# Patient Record
Sex: FEMALE | Race: WHITE | NOT HISPANIC OR LATINO | URBAN - METROPOLITAN AREA
[De-identification: names, ages, dates, MRNs, and addresses within clinical notes are randomized per-mention and may not be internally consistent; named-entity substitution may affect disease eponyms.]

---

## 2017-05-13 ENCOUNTER — EMERGENCY (EMERGENCY)
Facility: HOSPITAL | Age: 82
LOS: 0 days | Discharge: HOME | End: 2017-05-13
Admitting: FAMILY MEDICINE

## 2017-05-27 ENCOUNTER — OUTPATIENT (OUTPATIENT)
Dept: OUTPATIENT SERVICES | Facility: HOSPITAL | Age: 82
LOS: 1 days | Discharge: HOME | End: 2017-05-27

## 2017-06-28 DIAGNOSIS — R91.8 OTHER NONSPECIFIC ABNORMAL FINDING OF LUNG FIELD: ICD-10-CM

## 2017-07-11 DIAGNOSIS — I10 ESSENTIAL (PRIMARY) HYPERTENSION: ICD-10-CM

## 2017-07-11 DIAGNOSIS — Z88.8 ALLERGY STATUS TO OTHER DRUGS, MEDICAMENTS AND BIOLOGICAL SUBSTANCES STATUS: ICD-10-CM

## 2017-07-11 DIAGNOSIS — R55 SYNCOPE AND COLLAPSE: ICD-10-CM

## 2017-07-11 DIAGNOSIS — K21.9 GASTRO-ESOPHAGEAL REFLUX DISEASE WITHOUT ESOPHAGITIS: ICD-10-CM

## 2017-07-11 DIAGNOSIS — R19.7 DIARRHEA, UNSPECIFIED: ICD-10-CM

## 2017-07-11 DIAGNOSIS — Z79.899 OTHER LONG TERM (CURRENT) DRUG THERAPY: ICD-10-CM

## 2017-07-11 DIAGNOSIS — Z88.0 ALLERGY STATUS TO PENICILLIN: ICD-10-CM

## 2017-07-11 DIAGNOSIS — Z79.82 LONG TERM (CURRENT) USE OF ASPIRIN: ICD-10-CM

## 2017-07-11 DIAGNOSIS — Z88.1 ALLERGY STATUS TO OTHER ANTIBIOTIC AGENTS STATUS: ICD-10-CM

## 2017-10-11 ENCOUNTER — OUTPATIENT (OUTPATIENT)
Dept: OUTPATIENT SERVICES | Facility: HOSPITAL | Age: 82
LOS: 1 days | Discharge: HOME | End: 2017-10-11

## 2017-10-11 DIAGNOSIS — E09.65 DRUG OR CHEMICAL INDUCED DIABETES MELLITUS WITH HYPERGLYCEMIA: ICD-10-CM

## 2017-10-11 DIAGNOSIS — E06.3 AUTOIMMUNE THYROIDITIS: ICD-10-CM

## 2017-10-11 DIAGNOSIS — E78.2 MIXED HYPERLIPIDEMIA: ICD-10-CM

## 2017-10-11 DIAGNOSIS — D53.9 NUTRITIONAL ANEMIA, UNSPECIFIED: ICD-10-CM

## 2017-10-11 DIAGNOSIS — I10 ESSENTIAL (PRIMARY) HYPERTENSION: ICD-10-CM

## 2018-05-08 ENCOUNTER — OUTPATIENT (OUTPATIENT)
Dept: OUTPATIENT SERVICES | Facility: HOSPITAL | Age: 83
LOS: 1 days | Discharge: HOME | End: 2018-05-08

## 2018-05-08 DIAGNOSIS — D53.9 NUTRITIONAL ANEMIA, UNSPECIFIED: ICD-10-CM

## 2018-05-08 DIAGNOSIS — R53.83 OTHER FATIGUE: ICD-10-CM

## 2018-05-08 DIAGNOSIS — E06.3 AUTOIMMUNE THYROIDITIS: ICD-10-CM

## 2018-05-08 DIAGNOSIS — E78.2 MIXED HYPERLIPIDEMIA: ICD-10-CM

## 2018-10-01 ENCOUNTER — OUTPATIENT (OUTPATIENT)
Dept: OUTPATIENT SERVICES | Facility: HOSPITAL | Age: 83
LOS: 1 days | Discharge: HOME | End: 2018-10-01

## 2018-10-01 DIAGNOSIS — E53.9 VITAMIN B DEFICIENCY, UNSPECIFIED: ICD-10-CM

## 2018-10-01 DIAGNOSIS — R53.83 OTHER FATIGUE: ICD-10-CM

## 2018-10-01 DIAGNOSIS — I10 ESSENTIAL (PRIMARY) HYPERTENSION: ICD-10-CM

## 2018-10-01 DIAGNOSIS — E06.3 AUTOIMMUNE THYROIDITIS: ICD-10-CM

## 2018-10-01 DIAGNOSIS — E78.2 MIXED HYPERLIPIDEMIA: ICD-10-CM

## 2018-10-01 DIAGNOSIS — D53.9 NUTRITIONAL ANEMIA, UNSPECIFIED: ICD-10-CM

## 2018-10-01 DIAGNOSIS — E55.9 VITAMIN D DEFICIENCY, UNSPECIFIED: ICD-10-CM

## 2019-03-13 ENCOUNTER — OUTPATIENT (OUTPATIENT)
Dept: OUTPATIENT SERVICES | Facility: HOSPITAL | Age: 84
LOS: 1 days | Discharge: HOME | End: 2019-03-13

## 2019-03-13 DIAGNOSIS — D53.9 NUTRITIONAL ANEMIA, UNSPECIFIED: ICD-10-CM

## 2019-03-13 DIAGNOSIS — E53.9 VITAMIN B DEFICIENCY, UNSPECIFIED: ICD-10-CM

## 2019-03-13 DIAGNOSIS — E09.65 DRUG OR CHEMICAL INDUCED DIABETES MELLITUS WITH HYPERGLYCEMIA: ICD-10-CM

## 2019-03-13 DIAGNOSIS — Z00.00 ENCOUNTER FOR GENERAL ADULT MEDICAL EXAMINATION WITHOUT ABNORMAL FINDINGS: ICD-10-CM

## 2019-03-13 DIAGNOSIS — E78.2 MIXED HYPERLIPIDEMIA: ICD-10-CM

## 2019-03-13 DIAGNOSIS — I10 ESSENTIAL (PRIMARY) HYPERTENSION: ICD-10-CM

## 2019-03-13 DIAGNOSIS — E11.8 TYPE 2 DIABETES MELLITUS WITH UNSPECIFIED COMPLICATIONS: ICD-10-CM

## 2019-03-13 DIAGNOSIS — E06.3 AUTOIMMUNE THYROIDITIS: ICD-10-CM

## 2019-03-13 DIAGNOSIS — E55.9 VITAMIN D DEFICIENCY, UNSPECIFIED: ICD-10-CM

## 2019-09-10 ENCOUNTER — OUTPATIENT (OUTPATIENT)
Dept: OUTPATIENT SERVICES | Facility: HOSPITAL | Age: 84
LOS: 1 days | Discharge: HOME | End: 2019-09-10

## 2019-09-10 DIAGNOSIS — E06.3 AUTOIMMUNE THYROIDITIS: ICD-10-CM

## 2019-09-10 DIAGNOSIS — D53.9 NUTRITIONAL ANEMIA, UNSPECIFIED: ICD-10-CM

## 2019-09-10 DIAGNOSIS — I10 ESSENTIAL (PRIMARY) HYPERTENSION: ICD-10-CM

## 2019-09-10 DIAGNOSIS — E78.2 MIXED HYPERLIPIDEMIA: ICD-10-CM

## 2019-09-10 DIAGNOSIS — E55.9 VITAMIN D DEFICIENCY, UNSPECIFIED: ICD-10-CM

## 2019-09-10 DIAGNOSIS — E11.8 TYPE 2 DIABETES MELLITUS WITH UNSPECIFIED COMPLICATIONS: ICD-10-CM

## 2019-09-10 DIAGNOSIS — E53.9 VITAMIN B DEFICIENCY, UNSPECIFIED: ICD-10-CM

## 2019-09-10 DIAGNOSIS — Z00.00 ENCOUNTER FOR GENERAL ADULT MEDICAL EXAMINATION WITHOUT ABNORMAL FINDINGS: ICD-10-CM

## 2021-08-07 ENCOUNTER — INPATIENT (INPATIENT)
Facility: HOSPITAL | Age: 86
LOS: 1 days | Discharge: HOME HEALTH | End: 2021-08-09
Attending: STUDENT IN AN ORGANIZED HEALTH CARE EDUCATION/TRAINING PROGRAM | Admitting: STUDENT IN AN ORGANIZED HEALTH CARE EDUCATION/TRAINING PROGRAM
Payer: MEDICARE

## 2021-08-07 VITALS
HEIGHT: 63 IN | SYSTOLIC BLOOD PRESSURE: 166 MMHG | WEIGHT: 149.91 LBS | OXYGEN SATURATION: 96 % | HEART RATE: 83 BPM | TEMPERATURE: 98 F | DIASTOLIC BLOOD PRESSURE: 71 MMHG | RESPIRATION RATE: 16 BRPM

## 2021-08-07 LAB
ALBUMIN SERPL ELPH-MCNC: 4.5 G/DL — SIGNIFICANT CHANGE UP (ref 3.5–5.2)
ALP SERPL-CCNC: 104 U/L — SIGNIFICANT CHANGE UP (ref 30–115)
ALT FLD-CCNC: 11 U/L — SIGNIFICANT CHANGE UP (ref 0–41)
ANION GAP SERPL CALC-SCNC: 13 MMOL/L — SIGNIFICANT CHANGE UP (ref 7–14)
APPEARANCE UR: CLEAR — SIGNIFICANT CHANGE UP
AST SERPL-CCNC: 17 U/L — SIGNIFICANT CHANGE UP (ref 0–41)
BASOPHILS # BLD AUTO: 0.02 K/UL — SIGNIFICANT CHANGE UP (ref 0–0.2)
BASOPHILS NFR BLD AUTO: 0.2 % — SIGNIFICANT CHANGE UP (ref 0–1)
BILIRUB SERPL-MCNC: 0.6 MG/DL — SIGNIFICANT CHANGE UP (ref 0.2–1.2)
BILIRUB UR-MCNC: NEGATIVE — SIGNIFICANT CHANGE UP
BUN SERPL-MCNC: 14 MG/DL — SIGNIFICANT CHANGE UP (ref 10–20)
CALCIUM SERPL-MCNC: 9.2 MG/DL — SIGNIFICANT CHANGE UP (ref 8.5–10.1)
CHLORIDE SERPL-SCNC: 105 MMOL/L — SIGNIFICANT CHANGE UP (ref 98–110)
CO2 SERPL-SCNC: 23 MMOL/L — SIGNIFICANT CHANGE UP (ref 17–32)
COLOR SPEC: YELLOW — SIGNIFICANT CHANGE UP
CREAT SERPL-MCNC: 0.8 MG/DL — SIGNIFICANT CHANGE UP (ref 0.7–1.5)
DIFF PNL FLD: NEGATIVE — SIGNIFICANT CHANGE UP
EOSINOPHIL # BLD AUTO: 0 K/UL — SIGNIFICANT CHANGE UP (ref 0–0.7)
EOSINOPHIL NFR BLD AUTO: 0 % — SIGNIFICANT CHANGE UP (ref 0–8)
GLUCOSE SERPL-MCNC: 125 MG/DL — HIGH (ref 70–99)
GLUCOSE UR QL: NEGATIVE — SIGNIFICANT CHANGE UP
HCT VFR BLD CALC: 39.3 % — SIGNIFICANT CHANGE UP (ref 37–47)
HGB BLD-MCNC: 12.8 G/DL — SIGNIFICANT CHANGE UP (ref 12–16)
IMM GRANULOCYTES NFR BLD AUTO: 0.4 % — HIGH (ref 0.1–0.3)
KETONES UR-MCNC: NEGATIVE — SIGNIFICANT CHANGE UP
LEUKOCYTE ESTERASE UR-ACNC: NEGATIVE — SIGNIFICANT CHANGE UP
LYMPHOCYTES # BLD AUTO: 0.66 K/UL — LOW (ref 1.2–3.4)
LYMPHOCYTES # BLD AUTO: 8 % — LOW (ref 20.5–51.1)
MAGNESIUM SERPL-MCNC: 1.7 MG/DL — LOW (ref 1.8–2.4)
MCHC RBC-ENTMCNC: 28.1 PG — SIGNIFICANT CHANGE UP (ref 27–31)
MCHC RBC-ENTMCNC: 32.6 G/DL — SIGNIFICANT CHANGE UP (ref 32–37)
MCV RBC AUTO: 86.2 FL — SIGNIFICANT CHANGE UP (ref 81–99)
MONOCYTES # BLD AUTO: 0.38 K/UL — SIGNIFICANT CHANGE UP (ref 0.1–0.6)
MONOCYTES NFR BLD AUTO: 4.6 % — SIGNIFICANT CHANGE UP (ref 1.7–9.3)
NEUTROPHILS # BLD AUTO: 7.12 K/UL — HIGH (ref 1.4–6.5)
NEUTROPHILS NFR BLD AUTO: 86.8 % — HIGH (ref 42.2–75.2)
NITRITE UR-MCNC: NEGATIVE — SIGNIFICANT CHANGE UP
NRBC # BLD: 0 /100 WBCS — SIGNIFICANT CHANGE UP (ref 0–0)
PH UR: 6.5 — SIGNIFICANT CHANGE UP (ref 5–8)
PLATELET # BLD AUTO: 294 K/UL — SIGNIFICANT CHANGE UP (ref 130–400)
POTASSIUM SERPL-MCNC: 3.8 MMOL/L — SIGNIFICANT CHANGE UP (ref 3.5–5)
POTASSIUM SERPL-SCNC: 3.8 MMOL/L — SIGNIFICANT CHANGE UP (ref 3.5–5)
PROT SERPL-MCNC: 6.6 G/DL — SIGNIFICANT CHANGE UP (ref 6–8)
PROT UR-MCNC: NEGATIVE — SIGNIFICANT CHANGE UP
RBC # BLD: 4.56 M/UL — SIGNIFICANT CHANGE UP (ref 4.2–5.4)
RBC # FLD: 14.4 % — SIGNIFICANT CHANGE UP (ref 11.5–14.5)
SODIUM SERPL-SCNC: 141 MMOL/L — SIGNIFICANT CHANGE UP (ref 135–146)
SP GR SPEC: 1.01 — SIGNIFICANT CHANGE UP (ref 1.01–1.03)
TROPONIN T SERPL-MCNC: <0.01 NG/ML — SIGNIFICANT CHANGE UP
UROBILINOGEN FLD QL: SIGNIFICANT CHANGE UP
WBC # BLD: 8.21 K/UL — SIGNIFICANT CHANGE UP (ref 4.8–10.8)
WBC # FLD AUTO: 8.21 K/UL — SIGNIFICANT CHANGE UP (ref 4.8–10.8)

## 2021-08-07 PROCEDURE — 70498 CT ANGIOGRAPHY NECK: CPT | Mod: 26,MA

## 2021-08-07 PROCEDURE — 99285 EMERGENCY DEPT VISIT HI MDM: CPT

## 2021-08-07 PROCEDURE — 70450 CT HEAD/BRAIN W/O DYE: CPT | Mod: 26,59,MA

## 2021-08-07 PROCEDURE — 93010 ELECTROCARDIOGRAM REPORT: CPT | Mod: 76

## 2021-08-07 PROCEDURE — 70496 CT ANGIOGRAPHY HEAD: CPT | Mod: 26,MA

## 2021-08-07 RX ORDER — ACETAMINOPHEN 500 MG
650 TABLET ORAL ONCE
Refills: 0 | Status: COMPLETED | OUTPATIENT
Start: 2021-08-07 | End: 2021-08-07

## 2021-08-07 RX ORDER — MAGNESIUM OXIDE 400 MG ORAL TABLET 241.3 MG
400 TABLET ORAL ONCE
Refills: 0 | Status: COMPLETED | OUTPATIENT
Start: 2021-08-07 | End: 2021-08-07

## 2021-08-07 RX ORDER — METOCLOPRAMIDE HCL 10 MG
10 TABLET ORAL ONCE
Refills: 0 | Status: COMPLETED | OUTPATIENT
Start: 2021-08-07 | End: 2021-08-07

## 2021-08-07 RX ORDER — MECLIZINE HCL 12.5 MG
50 TABLET ORAL ONCE
Refills: 0 | Status: COMPLETED | OUTPATIENT
Start: 2021-08-07 | End: 2021-08-07

## 2021-08-07 RX ORDER — SODIUM CHLORIDE 9 MG/ML
250 INJECTION INTRAMUSCULAR; INTRAVENOUS; SUBCUTANEOUS ONCE
Refills: 0 | Status: COMPLETED | OUTPATIENT
Start: 2021-08-07 | End: 2021-08-07

## 2021-08-07 RX ADMIN — Medication 50 MILLIGRAM(S): at 16:27

## 2021-08-07 RX ADMIN — SODIUM CHLORIDE 250 MILLILITER(S): 9 INJECTION INTRAMUSCULAR; INTRAVENOUS; SUBCUTANEOUS at 16:28

## 2021-08-07 RX ADMIN — Medication 104 MILLIGRAM(S): at 16:27

## 2021-08-07 RX ADMIN — Medication 650 MILLIGRAM(S): at 20:27

## 2021-08-07 RX ADMIN — MAGNESIUM OXIDE 400 MG ORAL TABLET 400 MILLIGRAM(S): 241.3 TABLET ORAL at 18:40

## 2021-08-07 NOTE — ED PROVIDER NOTE - PHYSICAL EXAMINATION
CONST: Well appearing in NAD  EYES: PERRL, EOMI, Sclera and conjunctiva clear.  NECK: Non-tender, no meningeal signs, supple,  CARD: Normal S1 S2; Normal rate and rhythm  RESP: Equal BS B/L, No wheezes, rhonchi or rales. No distress  GI: Soft, non-tender, non-distended.  MS: Normal ROM in all extremities. No edema of lower extremities, no calf pain, radial pulses 2+ bilaterally  SKIN: chronic lesions of forehead.   NEURO: A&Ox3, CN II-XII intact, no focal deficits, no facial asymmetry, no pronator drift, normal finger to nose, no nystagmus, gross sensation intact, UE/LE strength 5/5

## 2021-08-07 NOTE — ED ADULT NURSE NOTE - OBJECTIVE STATEMENT
87 year old female complaining of dizziness since last night. Pt states she has been "spitting up" but denies fever, nausea, diarrhea.

## 2021-08-07 NOTE — ED PROVIDER NOTE - CARE PLAN
Principal Discharge DX:	Vertigo  Goal:	intractable  Secondary Diagnosis:	Ambulatory dysfunction  Secondary Diagnosis:	Inadequate social support

## 2021-08-07 NOTE — ED PROVIDER NOTE - ATTENDING CONTRIBUTION TO CARE
86 yo f with pmh of gerd, bells palsy, htn, hld, presents with c/o dizziness.  pt says started last night but worse today.  exacerbated by head movement, bending down or turning her head too fast.  pt says feels worse than her usual vertigo.  denies headache, neck pain, cp, sob, abd pain, leg swelling.  denies facial droop, slurred speech, vision changes, extremity weakness.  pt says she had a hard time walking due to the dizziness.  exam: nad, ncat, perrl, eomi, mmm, rrr, ctab, abd soft, nt,nd, aox3, cn2-12 normal, 5/5 strength all ext, sensation intact, finger to nose normal, romberg neg, prontator drift neg, gait normal imp: pt with dizziness, will check labs, ekg, cxr, ct, cta h/n, meclizine, reassess

## 2021-08-07 NOTE — ED PROVIDER NOTE - OBJECTIVE STATEMENT
87 y.o female w/ hx of HTN, HLD, GERD, Bell's Palsy (L side), vertigo presents to the ED for evaluation of dizziness. last night developed room spinning sensation, worse w/ moving head, mild severity, no associated pain. States since onset sxs persistent prompting visit to the ED. States dizziness worse than typical vertigo.  Denies recent fall/injury, headache, changes in vision, fever, chills, chest pain, dyspnea, abd pain, nausea, vomiting. Also admits to dry cough since yesterday.

## 2021-08-07 NOTE — ED PROVIDER NOTE - CLINICAL SUMMARY MEDICAL DECISION MAKING FREE TEXT BOX
Patient was signed out to me by Dr. Joseph pending CTA results. Patient remained hemodynamically stable, results of the labs, imaging findings reviewed and discussed with patient, discussed with admitting physician and MAR, patient is admitted to Medicine for further evaluation and care.

## 2021-08-07 NOTE — ED PROVIDER NOTE - NS ED ROS FT
Constitutional: See HPI.  Eyes: No visual changes, eye pain or discharge.   ENMT: No hearing changes, pain, discharge or infections. No neck pain or stiffness. No limited ROM  Cardiac: No SOB or edema. No chest pain with exertion.  Respiratory: No cough or respiratory distress.   GI: No nausea, vomiting, diarrhea or abdominal pain.  : No dysuria, frequency or burning. No Discharge  MS: No myalgia, muscle weakness, joint pain or back pain.  Neuro: + dizziness. No headache or weakness. No LOC.  Skin: No skin rash.  Except as documented in the HPI, all other systems are negative.

## 2021-08-07 NOTE — ED ADULT NURSE NOTE - NSIMPLEMENTINTERV_GEN_ALL_ED
Implemented All Fall with Harm Risk Interventions:  [place] to call system. Call bell, personal items and telephone within reach. Instruct patient to call for assistance. Room bathroom lighting operational. Non-slip footwear when patient is off stretcher. Physically safe environment: no spills, clutter or unnecessary equipment. Stretcher in lowest position, wheels locked, appropriate side rails in place. Provide visual cue, wrist band, yellow gown, etc. Monitor gait and stability. Monitor for mental status changes and reorient to person, place, and time. Review medications for side effects contributing to fall risk. Reinforce activity limits and safety measures with patient and family. Provide visual clues: red socks.

## 2021-08-08 LAB — SARS-COV-2 RNA SPEC QL NAA+PROBE: SIGNIFICANT CHANGE UP

## 2021-08-08 PROCEDURE — 99223 1ST HOSP IP/OBS HIGH 75: CPT

## 2021-08-08 RX ORDER — CITALOPRAM 10 MG/1
1 TABLET, FILM COATED ORAL
Qty: 0 | Refills: 0 | DISCHARGE

## 2021-08-08 RX ORDER — LOSARTAN POTASSIUM 100 MG/1
100 TABLET, FILM COATED ORAL DAILY
Refills: 0 | Status: DISCONTINUED | OUTPATIENT
Start: 2021-08-08 | End: 2021-08-09

## 2021-08-08 RX ORDER — PREGABALIN 225 MG/1
1000 CAPSULE ORAL DAILY
Refills: 0 | Status: DISCONTINUED | OUTPATIENT
Start: 2021-08-08 | End: 2021-08-09

## 2021-08-08 RX ORDER — PANTOPRAZOLE SODIUM 20 MG/1
40 TABLET, DELAYED RELEASE ORAL
Refills: 0 | Status: DISCONTINUED | OUTPATIENT
Start: 2021-08-08 | End: 2021-08-09

## 2021-08-08 RX ORDER — IRBESARTAN 75 MG/1
1 TABLET ORAL
Qty: 0 | Refills: 0 | DISCHARGE

## 2021-08-08 RX ORDER — ALPRAZOLAM 0.25 MG
0 TABLET ORAL
Qty: 0 | Refills: 0 | DISCHARGE

## 2021-08-08 RX ORDER — HEPARIN SODIUM 5000 [USP'U]/ML
5000 INJECTION INTRAVENOUS; SUBCUTANEOUS EVERY 12 HOURS
Refills: 0 | Status: DISCONTINUED | OUTPATIENT
Start: 2021-08-08 | End: 2021-08-09

## 2021-08-08 RX ORDER — CHLORHEXIDINE GLUCONATE 213 G/1000ML
1 SOLUTION TOPICAL
Refills: 0 | Status: DISCONTINUED | OUTPATIENT
Start: 2021-08-08 | End: 2021-08-09

## 2021-08-08 RX ORDER — PREGABALIN 225 MG/1
1 CAPSULE ORAL
Qty: 0 | Refills: 0 | DISCHARGE

## 2021-08-08 RX ORDER — AMLODIPINE BESYLATE 2.5 MG/1
1 TABLET ORAL
Qty: 0 | Refills: 0 | DISCHARGE

## 2021-08-08 RX ORDER — ALPRAZOLAM 0.25 MG
0.5 TABLET ORAL AT BEDTIME
Refills: 0 | Status: DISCONTINUED | OUTPATIENT
Start: 2021-08-09 | End: 2021-08-09

## 2021-08-08 RX ORDER — AMLODIPINE BESYLATE 2.5 MG/1
10 TABLET ORAL DAILY
Refills: 0 | Status: DISCONTINUED | OUTPATIENT
Start: 2021-08-08 | End: 2021-08-09

## 2021-08-08 RX ORDER — MECLIZINE HCL 12.5 MG
25 TABLET ORAL EVERY 8 HOURS
Refills: 0 | Status: DISCONTINUED | OUTPATIENT
Start: 2021-08-08 | End: 2021-08-09

## 2021-08-08 RX ORDER — ALPRAZOLAM 0.25 MG
0.5 TABLET ORAL ONCE
Refills: 0 | Status: DISCONTINUED | OUTPATIENT
Start: 2021-08-08 | End: 2021-08-09

## 2021-08-08 RX ORDER — MECLIZINE HCL 12.5 MG
12.5 TABLET ORAL EVERY 6 HOURS
Refills: 0 | Status: DISCONTINUED | OUTPATIENT
Start: 2021-08-08 | End: 2021-08-08

## 2021-08-08 RX ORDER — CITALOPRAM 10 MG/1
10 TABLET, FILM COATED ORAL DAILY
Refills: 0 | Status: DISCONTINUED | OUTPATIENT
Start: 2021-08-08 | End: 2021-08-09

## 2021-08-08 RX ORDER — OMEPRAZOLE 10 MG/1
1 CAPSULE, DELAYED RELEASE ORAL
Qty: 0 | Refills: 0 | DISCHARGE

## 2021-08-08 RX ADMIN — CITALOPRAM 10 MILLIGRAM(S): 10 TABLET, FILM COATED ORAL at 11:02

## 2021-08-08 RX ADMIN — LOSARTAN POTASSIUM 100 MILLIGRAM(S): 100 TABLET, FILM COATED ORAL at 05:50

## 2021-08-08 RX ADMIN — AMLODIPINE BESYLATE 10 MILLIGRAM(S): 2.5 TABLET ORAL at 05:50

## 2021-08-08 RX ADMIN — PREGABALIN 1000 MICROGRAM(S): 225 CAPSULE ORAL at 11:02

## 2021-08-08 RX ADMIN — Medication 25 MILLIGRAM(S): at 22:01

## 2021-08-08 NOTE — H&P ADULT - NSHPPHYSICALEXAM_GEN_ALL_CORE
GENERAL: NAD, lying in bed comfortably  CHEST/LUNG: Clear to auscultation bilaterally; Unlabored respirations  HEART: Regular rate and rhythm; No murmurs, rubs, or gallops  ABDOMEN: Bowel sounds present; Soft, Nontender, Nondistended.   EXTREMITIES:  no edema  NERVOUS SYSTEM:  Alert & Oriented X3, ptosis of left eye. left facial droop- chronic   MSK: FROM all 4 extremities, full and equal strength  SKIN: No rashes or lesions

## 2021-08-08 NOTE — H&P ADULT - NSCORESITESY/N_GEN_A_CORE_RD
Color consistent with ethnicity/race, warm, dry intact, resilient. No Color consistent with ethnicity/race, warm, dry intact, resilient.

## 2021-08-08 NOTE — H&P ADULT - ATTENDING COMMENTS
Patient seen and examined independently. Agree with resident note with exceptions. Case discussed with housestaff, nursing and patient    Pt c/o nausea, decrease PO intake, dizziness improving.    # Vertigo - peripheral  -  CT Angio Head 08.07.21 @ 18:47) > No large vessel occlusion. Mild irregular narrowing of the V4 segment of the right vertebral artery.  - 12 Lead ECG (08.07.21 @ 17:23) > Normal sinus rhythm. ST & T wave abnormality, consider anterolateral ischemia  - Troponin T, Serum: <0.01 ng/mL (08.07.21 @ 16:32)  - check orthostats  - c/w meclizine  - zofran prn  - PT eval    # Hypertension- stable  - c/w losartan, norvasc    # GERD  - c/w protonix    # Depression  - c/w home meds    # DVT prophylaxis    # Full code    # Pending: clinical improvement, PT eval., anticipate for discharge in AM  # Discussed plan of care with patent  # Disposition: Home when stable

## 2021-08-08 NOTE — H&P ADULT - ASSESSMENT
86 yo f with pmh of gerd, bells palsy, htn, hld, presents with c/o dizziness.     #Vertigo - likely BPPV. unlikely central vertigo  - CT imaging no intracranial abnormality  - obtain orthostatics    - symptom control - meclizine prn   - PT/ OT consult     #HTN - continue home medications  #HLD - not on statins- check lipid profile  #Vitamin B12 def- continue supplementation. check b12 levels  #GERD- continue protonix       # GI PPx - Protonix   # DVT PPx -  Lovenox 40 mg SubQ   # Activity -  (X) Increase as Tolerated    # Dispo -   Patient to be discharged when medically optimized.  # Code Status -  FULL

## 2021-08-08 NOTE — H&P ADULT - HISTORY OF PRESENT ILLNESS
86 yo f with pmh of gerd, bells palsy, htn, hld, presents with c/o dizziness.  Patient states that her symptoms started Friday at 10 pm when she was watching tv. All of the sudden the room started to spin. Sensation of room spinning has been coming on and off since then. Exacerbated by head movement, bending down or turning her head too fast. She states that she gets nauseous but has not had any vomiting. No recent illness -fevers, sob, coughs, runny nose, diarrhea, abdominal pain.   pt says she had a hard time walking due to the dizziness. Currently patient is feeling better and does not complain of any issues.  Patient lives with her daughter who is special needs at home. She does not feel like she has adequate help at home.     In the ED vitals were /71, HR 83, 96% on RA. Labs WNL. Imaging showed No acute intracranial abnormality. No large vessel occlusion. Severe degenerative changes of the spine.

## 2021-08-09 ENCOUNTER — TRANSCRIPTION ENCOUNTER (OUTPATIENT)
Age: 86
End: 2021-08-09

## 2021-08-09 VITALS
HEART RATE: 80 BPM | DIASTOLIC BLOOD PRESSURE: 70 MMHG | RESPIRATION RATE: 18 BRPM | TEMPERATURE: 97 F | SYSTOLIC BLOOD PRESSURE: 130 MMHG

## 2021-08-09 LAB
24R-OH-CALCIDIOL SERPL-MCNC: 16 NG/ML — LOW (ref 30–80)
ALBUMIN SERPL ELPH-MCNC: 4.4 G/DL — SIGNIFICANT CHANGE UP (ref 3.5–5.2)
ALP SERPL-CCNC: 105 U/L — SIGNIFICANT CHANGE UP (ref 30–115)
ALT FLD-CCNC: 12 U/L — SIGNIFICANT CHANGE UP (ref 0–41)
ANION GAP SERPL CALC-SCNC: 14 MMOL/L — SIGNIFICANT CHANGE UP (ref 7–14)
AST SERPL-CCNC: 19 U/L — SIGNIFICANT CHANGE UP (ref 0–41)
BASOPHILS # BLD AUTO: 0.03 K/UL — SIGNIFICANT CHANGE UP (ref 0–0.2)
BASOPHILS NFR BLD AUTO: 0.3 % — SIGNIFICANT CHANGE UP (ref 0–1)
BILIRUB SERPL-MCNC: 1.2 MG/DL — SIGNIFICANT CHANGE UP (ref 0.2–1.2)
BUN SERPL-MCNC: 10 MG/DL — SIGNIFICANT CHANGE UP (ref 10–20)
CALCIUM SERPL-MCNC: 9.5 MG/DL — SIGNIFICANT CHANGE UP (ref 8.5–10.1)
CHLORIDE SERPL-SCNC: 103 MMOL/L — SIGNIFICANT CHANGE UP (ref 98–110)
CHOLEST SERPL-MCNC: 187 MG/DL — SIGNIFICANT CHANGE UP
CO2 SERPL-SCNC: 24 MMOL/L — SIGNIFICANT CHANGE UP (ref 17–32)
COVID-19 SPIKE DOMAIN AB INTERP: POSITIVE
COVID-19 SPIKE DOMAIN ANTIBODY RESULT: 168 U/ML — HIGH
CREAT SERPL-MCNC: 0.8 MG/DL — SIGNIFICANT CHANGE UP (ref 0.7–1.5)
EOSINOPHIL # BLD AUTO: 0.14 K/UL — SIGNIFICANT CHANGE UP (ref 0–0.7)
EOSINOPHIL NFR BLD AUTO: 1.6 % — SIGNIFICANT CHANGE UP (ref 0–8)
GLUCOSE BLDC GLUCOMTR-MCNC: 116 MG/DL — HIGH (ref 70–99)
GLUCOSE SERPL-MCNC: 83 MG/DL — SIGNIFICANT CHANGE UP (ref 70–99)
HCT VFR BLD CALC: 41.4 % — SIGNIFICANT CHANGE UP (ref 37–47)
HDLC SERPL-MCNC: 60 MG/DL — SIGNIFICANT CHANGE UP
HGB BLD-MCNC: 13.2 G/DL — SIGNIFICANT CHANGE UP (ref 12–16)
IMM GRANULOCYTES NFR BLD AUTO: 0.3 % — SIGNIFICANT CHANGE UP (ref 0.1–0.3)
LIPID PNL WITH DIRECT LDL SERPL: 124 MG/DL — HIGH
LYMPHOCYTES # BLD AUTO: 1.67 K/UL — SIGNIFICANT CHANGE UP (ref 1.2–3.4)
LYMPHOCYTES # BLD AUTO: 19 % — LOW (ref 20.5–51.1)
MAGNESIUM SERPL-MCNC: 1.8 MG/DL — SIGNIFICANT CHANGE UP (ref 1.8–2.4)
MCHC RBC-ENTMCNC: 27.4 PG — SIGNIFICANT CHANGE UP (ref 27–31)
MCHC RBC-ENTMCNC: 31.9 G/DL — LOW (ref 32–37)
MCV RBC AUTO: 85.9 FL — SIGNIFICANT CHANGE UP (ref 81–99)
MONOCYTES # BLD AUTO: 0.89 K/UL — HIGH (ref 0.1–0.6)
MONOCYTES NFR BLD AUTO: 10.1 % — HIGH (ref 1.7–9.3)
NEUTROPHILS # BLD AUTO: 6.01 K/UL — SIGNIFICANT CHANGE UP (ref 1.4–6.5)
NEUTROPHILS NFR BLD AUTO: 68.7 % — SIGNIFICANT CHANGE UP (ref 42.2–75.2)
NON HDL CHOLESTEROL: 127 MG/DL — SIGNIFICANT CHANGE UP
NRBC # BLD: 0 /100 WBCS — SIGNIFICANT CHANGE UP (ref 0–0)
PLATELET # BLD AUTO: 285 K/UL — SIGNIFICANT CHANGE UP (ref 130–400)
POTASSIUM SERPL-MCNC: 3.6 MMOL/L — SIGNIFICANT CHANGE UP (ref 3.5–5)
POTASSIUM SERPL-SCNC: 3.6 MMOL/L — SIGNIFICANT CHANGE UP (ref 3.5–5)
PROT SERPL-MCNC: 6.6 G/DL — SIGNIFICANT CHANGE UP (ref 6–8)
RBC # BLD: 4.82 M/UL — SIGNIFICANT CHANGE UP (ref 4.2–5.4)
RBC # FLD: 14.6 % — HIGH (ref 11.5–14.5)
SARS-COV-2 IGG+IGM SERPL QL IA: 168 U/ML — HIGH
SARS-COV-2 IGG+IGM SERPL QL IA: POSITIVE
SODIUM SERPL-SCNC: 141 MMOL/L — SIGNIFICANT CHANGE UP (ref 135–146)
TRIGL SERPL-MCNC: 84 MG/DL — SIGNIFICANT CHANGE UP
WBC # BLD: 8.77 K/UL — SIGNIFICANT CHANGE UP (ref 4.8–10.8)
WBC # FLD AUTO: 8.77 K/UL — SIGNIFICANT CHANGE UP (ref 4.8–10.8)

## 2021-08-09 PROCEDURE — 99239 HOSP IP/OBS DSCHRG MGMT >30: CPT

## 2021-08-09 RX ORDER — MECLIZINE HCL 12.5 MG
1 TABLET ORAL
Qty: 42 | Refills: 0
Start: 2021-08-09 | End: 2021-08-22

## 2021-08-09 RX ADMIN — Medication 25 MILLIGRAM(S): at 05:09

## 2021-08-09 RX ADMIN — HEPARIN SODIUM 5000 UNIT(S): 5000 INJECTION INTRAVENOUS; SUBCUTANEOUS at 05:09

## 2021-08-09 RX ADMIN — Medication 0.5 MILLIGRAM(S): at 00:14

## 2021-08-09 RX ADMIN — PREGABALIN 1000 MICROGRAM(S): 225 CAPSULE ORAL at 11:54

## 2021-08-09 RX ADMIN — CITALOPRAM 10 MILLIGRAM(S): 10 TABLET, FILM COATED ORAL at 11:54

## 2021-08-09 RX ADMIN — CHLORHEXIDINE GLUCONATE 1 APPLICATION(S): 213 SOLUTION TOPICAL at 05:09

## 2021-08-09 RX ADMIN — Medication 25 MILLIGRAM(S): at 14:12

## 2021-08-09 RX ADMIN — PANTOPRAZOLE SODIUM 40 MILLIGRAM(S): 20 TABLET, DELAYED RELEASE ORAL at 06:16

## 2021-08-09 RX ADMIN — AMLODIPINE BESYLATE 10 MILLIGRAM(S): 2.5 TABLET ORAL at 05:09

## 2021-08-09 RX ADMIN — LOSARTAN POTASSIUM 100 MILLIGRAM(S): 100 TABLET, FILM COATED ORAL at 05:09

## 2021-08-09 NOTE — PHYSICAL THERAPY INITIAL EVALUATION ADULT - PERTINENT HX OF CURRENT PROBLEM, REHAB EVAL
86 yo f with pmh of gerd, bells palsy, htn, hld, presents with c/o dizziness.  Patient states that her symptoms started Friday at 10 pm when she was watching tv. All of the sudden the room started to spin. Sensation of room spinning has been coming on and off since then.

## 2021-08-09 NOTE — DISCHARGE NOTE PROVIDER - CARE PROVIDERS DIRECT ADDRESSES
[email or web address]

## 2021-08-09 NOTE — DISCHARGE NOTE PROVIDER - NSDCCPCAREPLAN_GEN_ALL_CORE_FT
PRINCIPAL DISCHARGE DIAGNOSIS  Diagnosis: Vertigo  Assessment and Plan of Treatment: You presented for dizziness that worsens with head movement, bending down, and walking. Imaging was negative for any vertigo associated pathology. You wre given the medication Meclizine for resolutoin of your vertigo. Since admission, your dizziness has resolved. You will follow-up in 2 weeks with your primary care physician.      SECONDARY DISCHARGE DIAGNOSES  Diagnosis: Ambulatory dysfunction  Assessment and Plan of Treatment:     Diagnosis: Inadequate social support  Assessment and Plan of Treatment:      PRINCIPAL DISCHARGE DIAGNOSIS  Diagnosis: Vertigo  Assessment and Plan of Treatment: You presented for dizziness that worsens with head movement, bending down, and walking. Imaging was negative for any vertigo associated pathology. You wre given the medication Meclizine for resolution of your vertigo. Since admission, your dizziness has resolved. You will follow-up in 2 weeks with your primary care physician and we reccomend you follow-up with a neurologist as well.      SECONDARY DISCHARGE DIAGNOSES  Diagnosis: Ambulatory dysfunction  Assessment and Plan of Treatment:     Diagnosis: Inadequate social support  Assessment and Plan of Treatment:

## 2021-08-09 NOTE — OCCUPATIONAL THERAPY INITIAL EVALUATION ADULT - PHYSICAL ASSIST/NONPHYSICAL ASSIST: SIT/STAND, REHAB EVAL
verbal cues/1 person assist Number Of Freeze-Thaw Cycles: 3 freeze-thaw cycles Detail Level: Detailed Duration Of Freeze Thaw-Cycle (Seconds): 5 Render Post-Care Instructions In Note?: no Consent: The patient's consent was obtained including but not limited to risks of crusting, scabbing, blistering, scarring, darker or lighter pigmentary change, recurrence, incomplete removal and infection. Post-Care Instructions: I reviewed with the patient in detail post-care instructions. Patient is to wear sunprotection, and avoid picking at any of the treated lesions. Pt may apply Vaseline to crusted or scabbing areas.

## 2021-08-09 NOTE — PROGRESS NOTE ADULT - SUBJECTIVE AND OBJECTIVE BOX
Patient is a 87y old  Female who presents with a chief complaint of Vertigo (09 Aug 2021 11:27)      Patient seen and examined at bedside.  Patient denies any chest pain or shortness of breath.  reports feeling anxious and would feel  better when home.    ALLERGIES:  No Known Allergies    MEDICATIONS:  ALPRAZolam 0.5 milliGRAM(s) Oral at bedtime  amLODIPine   Tablet 10 milliGRAM(s) Oral daily  chlorhexidine 4% Liquid 1 Application(s) Topical <User Schedule>  citalopram 10 milliGRAM(s) Oral daily  cyanocobalamin 1000 MICROGram(s) Oral daily  heparin   Injectable 5000 Unit(s) SubCutaneous every 12 hours  losartan 100 milliGRAM(s) Oral daily  meclizine 25 milliGRAM(s) Oral every 8 hours  pantoprazole    Tablet 40 milliGRAM(s) Oral before breakfast    Vital Signs Last 24 Hrs  T(F): 97.6 (09 Aug 2021 08:05), Max: 98 (09 Aug 2021 01:30)  HR: 88 (09 Aug 2021 09:30) (64 - 88)  BP: 134/61 (09 Aug 2021 09:30) (134/61 - 184/64)  RR: 18 (09 Aug 2021 09:30) (18 - 18)  SpO2: 97% (08 Aug 2021 23:18) (96% - 97%)  I&O's Summary      PHYSICAL EXAM:  General: NAD, A/O x 3  ENT: MMM, left eye ptosis   Neck: Supple, No JVD  Lungs: Clear to auscultation bilaterally, no crackles or wheezing  Cardio: RRR, S1/S2, 2/6 systolic murmur   Abdomen: Soft, Nontender, Nondistended; Bowel sounds present  Extremities: No cyanosis, No edema    LABS:                        13.2   8.77  )-----------( 285      ( 09 Aug 2021 06:55 )             41.4     -    141  |  103  |  10  ----------------------------<  83  3.6   |  24  |  0.8    Ca    9.5      09 Aug 2021 06:55  Mg     1.8     -    TPro  6.6  /  Alb  4.4  /  TBili  1.2  /  DBili  x   /  AST  19  /  ALT  12  /  AlkPhos  105  08-09    eGFR if Non African American: 66 mL/min/1.73M2 (21 @ 06:55)  eGFR if African American: 77 mL/min/1.73M2 (21 @ 06:55)        CARDIAC MARKERS ( 07 Aug 2021 16:32 )  x     / <0.01 ng/mL / x     / x     / x          - Chol 187 mg/dL LDL -- HDL 60 mg/dL Trig 84 mg/dL                  Urinalysis Basic - ( 07 Aug 2021 19:11 )    Color: Yellow / Appearance: Clear / S.010 / pH: x  Gluc: x / Ketone: Negative  / Bili: Negative / Urobili: <2 mg/dL   Blood: x / Protein: Negative / Nitrite: Negative   Leuk Esterase: Negative / RBC: x / WBC x   Sq Epi: x / Non Sq Epi: x / Bacteria: x        COVID-19 PCR: NotDetec (21 @ 00:00)      RADIOLOGY & ADDITIONAL TESTS:    Care Discussed with Consultants/Other Providers:

## 2021-08-09 NOTE — DISCHARGE NOTE PROVIDER - NSDCMRMEDTOKEN_GEN_ALL_CORE_FT
ALPRAZolam 0.5 mg oral tablet: orally once a day  amLODIPine 10 mg oral tablet: 1 tab(s) orally once a day  citalopram 10 mg oral tablet: 1 tab(s) orally once a day  irbesartan 300 mg oral tablet: 1 tab(s) orally once a day  omeprazole 20 mg oral delayed release capsule: 1 cap(s) orally once a day  Vitamin B12 1000 mcg oral tablet: 1 tab(s) orally once a day   ALPRAZolam 0.5 mg oral tablet: orally once a day  amLODIPine 10 mg oral tablet: 1 tab(s) orally once a day  citalopram 10 mg oral tablet: 1 tab(s) orally once a day  irbesartan 300 mg oral tablet: 1 tab(s) orally once a day  meclizine 25 mg oral tablet: 1 tab(s) orally every 8 hours, As Needed -for dizziness   omeprazole 20 mg oral delayed release capsule: 1 cap(s) orally once a day  Vitamin B12 1000 mcg oral tablet: 1 tab(s) orally once a day

## 2021-08-09 NOTE — PROGRESS NOTE ADULT - ASSESSMENT
88yo F, PMHx of GERD, [place] palsy, HTN, HLD, presents for vertigo.    ~ Anticipated discharge today 8/9/21~    # Vertigo  - CTH 8/7/21: No acute intracranial abnormality  - CTAngio H&N 8/7/21: No large vessel occlusion  - EKG 8/7/21: Sinus rhythm, ST/T wave abnormality   - Dizziness has resolved since admission  - C/w Meclizine PRN  - Follow-up OP Neurologist  - As per PT 8/9/21:  - Recommend extender care facility (vs subacute rehab?)   - Dc w/ rolling walker     # HTN   - C/w Amlodipine, Losartan    # HLD   - Not on statin   - Lipid Panel 8/9/21: 124 LDL, 187 Chol, 60 HDL, 84 TG    # Vitamin B12 def  - C/w Cyanocobalamin  - F/u B12 levels    # GERD  - C/w Protonix     Diet: Regular Diet  Activity: IAT  DVT ppx: Not indicated  GI ppx: Protonix    Dispo: Subacute Rehab?  ? 86yo F, PMHx of GERD, [place] palsy, HTN, HLD, presents for vertigo.    ~ Anticipated discharge today 8/9/21~    # Vertigo  - CTH 8/7/21: No acute intracranial abnormality  - CTAngio H&N 8/7/21: No large vessel occlusion  - EKG 8/7/21: Sinus rhythm, ST/T wave abnormality   - Dizziness has resolved since admission  - C/w Meclizine PRN  - Follow-up OP Neurologist  - As per PT 8/9/21:  - Recommend extender care facility (vs subacute rehab?)   - Dc w/ rolling walker   - Awaiting case management placement for subacute rehab     # HTN   - C/w Amlodipine, Losartan    # HLD   - Not on statin   - Lipid Panel 8/9/21: 124 LDL, 187 Chol, 60 HDL, 84 TG    # Vitamin B12 def  - C/w Cyanocobalamin  - F/u B12 levels    # GERD  - C/w Protonix     Diet: Regular Diet  Activity: IAT  DVT ppx: Not indicated  GI ppx: Protonix    Dispo: Subacute Rehab    Note reviewed by floor senior EAMON Smith

## 2021-08-09 NOTE — OCCUPATIONAL THERAPY INITIAL EVALUATION ADULT - TRANSFER TRAINING, PT EVAL
Patient will perform sit<>stand transfer independently by discharge. ; Patient will perform toilet transfer independently with appropriate DME by discharge.

## 2021-08-09 NOTE — PROVIDER CONTACT NOTE (OTHER) - SITUATION
pt is c/o throbbing intermittent pain to her left breast/ chest area. pt stated pain is not sharp. pt is unable to further describe pain despite RN's questions. VSS

## 2021-08-09 NOTE — DISCHARGE NOTE NURSING/CASE MANAGEMENT/SOCIAL WORK - PATIENT PORTAL LINK FT
You can access the FollowMyHealth Patient Portal offered by [facility] by registering at the following website: [email or web address]. By joining [employer]’s FollowMyHealth portal, you will also be able to view your health information using other applications (apps) compatible with our system.

## 2021-08-09 NOTE — DISCHARGE NOTE PROVIDER - HOSPITAL COURSE
86 yo f with pmh of gerd, bells palsy, htn, hld, presents with c/o dizziness.  Patient states that her symptoms started Friday 8/6 at 10 pm when she was watching tv. All of the sudden the room started to spin. Sensation of room spinning has been coming on and off since then. Exacerbated by head movement, bending down or turning her head too fast. She states that she gets nauseous but has not had any vomiting. No recent illness -fevers, sob, coughs, runny nose, diarrhea, abdominal pain.   pt says she had a hard time walking due to the dizziness. CT-Angio showed mild irregular narrowing of the R vertebral artery and CTH showed no acute intracranial pathology. Patient reports no more episodes of dizziness and is stable for discharge home.    < from: CT Angio Head w/ IV Cont (08.07.21 @ 18:47) >    IMPRESSION:    No large vessel occlusion.    Severe degenerative changes of the spine.    < end of copied text >    < from: CT Head No Cont (08.07.21 @ 16:49) >    Impression:    No acute intracranial abnormality.    < end of copied text >       88 yo F, with PMHx of GERD, [place] Palsy, HTN, HLD, presents with dizziness.  Patient states that her symptoms started Friday 8/6 at 10 pm when she was watching tv. All of the sudden the room started to spin. Sensation of room spinning has been coming on and off since then. Exacerbated by head movement, bending down or turning her head too fast. She states that she gets nauseous but has not had any vomiting. No recent illness -fevers, sob, coughs, runny nose, diarrhea, abdominal pain. Pt says she had a hard time walking due to the dizziness. CT-Angio of the Head&Neck showed mild irregular narrowing of the R vertebral artery and CTH showed no acute intracranial pathology. Pt was placed on Meclizine. She reports no more episodes of dizziness and is stable for discharge home.    < from: CT Angio Head w/ IV Cont (08.07.21 @ 18:47) >    IMPRESSION:    No large vessel occlusion.    Severe degenerative changes of the spine.    < end of copied text >    < from: CT Head No Cont (08.07.21 @ 16:49) >    Impression:    No acute intracranial abnormality.    < end of copied text >       86 yo F, with PMHx of GERD, [place] Palsy, HTN, HLD, presents with dizziness.  Patient states that her symptoms started Friday 8/6 at 10 pm when she was watching tv. All of the sudden the room started to spin. Sensation of room spinning has been coming on and off since then. Exacerbated by head movement, bending down or turning her head too fast. She states that she gets nauseous but has not had any vomiting. No recent illness -fevers, sob, coughs, runny nose, diarrhea, abdominal pain. Pt says she had a hard time walking due to the dizziness. CT-Angio of the Head&Neck showed mild irregular narrowing of the R vertebral artery and CTH showed no acute intracranial pathology. Pt was placed on Meclizine. She reports no more episodes of dizziness and is stable for discharge home.    < from: CT Angio Head w/ IV Cont (08.07.21 @ 18:47) >  IMPRESSION:  No large vessel occlusion.  Severe degenerative changes of the spine.  < end of copied text >    < from: CT Head No Cont (08.07.21 @ 16:49) >  Impression:  No acute intracranial abnormality.  < end of copied text >       88 yo F, with PMHx of GERD, [place] Palsy, HTN, HLD, presents with dizziness.  Patient states that her symptoms started Friday 8/6 at 10 pm when she was watching tv. All of the sudden the room started to spin. Sensation of room spinning has been coming on and off since then. Exacerbated by head movement, bending down or turning her head too fast. She states that she gets nauseous but has not had any vomiting. No recent illness -fevers, sob, coughs, runny nose, diarrhea, abdominal pain. Pt says she had a hard time walking due to the dizziness. CT-Angio of the Head&Neck showed mild irregular narrowing of the R vertebral artery and CTH showed no acute intracranial pathology. Pt was placed on Meclizine. She reports no more episodes of dizziness and is stable for discharge home.  Patient is being discharged with home care to her home, where she lives with her daughter.  Patient refused subacute rehab.   < from: CT Angio Head w/ IV Cont (08.07.21 @ 18:47) >  IMPRESSION:  No large vessel occlusion.  Severe degenerative changes of the spine.  < end of copied text >    < from: CT Head No Cont (08.07.21 @ 16:49) >  Impression:  No acute intracranial abnormality.  < end of copied text >    I have personally seen and examined patient on the above date.  I discussed the case with Dr. Smith and I agree with findings and plan as detailed per note above, which I have amended where appropriate.       88 yo F, with PMHx of GERD, [place] Palsy, HTN, HLD, presents with dizziness.  Patient states that her symptoms started Friday 8/6 at 10 pm when she was watching tv. All of the sudden the room started to spin. Sensation of room spinning has been coming on and off since then. Exacerbated by head movement, bending down or turning her head too fast. She states that she gets nauseous but has not had any vomiting. No recent illness -fevers, sob, coughs, runny nose, diarrhea, abdominal pain. Pt says she had a hard time walking due to the dizziness. CT-Angio of the Head&Neck showed mild irregular narrowing of the R vertebral artery and CTH showed no acute intracranial pathology. Pt was placed on Meclizine. She reports no more episodes of dizziness and is stable for discharge home.  Patient is being discharged with home care to her home, where she lives with her daughter.  Patient refused subacute rehab.     < from: CT Angio Head w/ IV Cont (08.07.21 @ 18:47) >  IMPRESSION:  No large vessel occlusion.  Severe degenerative changes of the spine.  < end of copied text >    < from: CT Head No Cont (08.07.21 @ 16:49) >  Impression:  No acute intracranial abnormality.  < end of copied text >    I have personally seen and examined patient on the above date.  I discussed the case with Dr. Smith and I agree with findings and plan as detailed per note above, which I have amended where appropriate.

## 2021-08-09 NOTE — OCCUPATIONAL THERAPY INITIAL EVALUATION ADULT - PLANNED THERAPY INTERVENTIONS, OT EVAL
ADL retraining/balance training/bed mobility training/neuromuscular re-education/parent/caregiver training.../strengthening/transfer training

## 2021-08-09 NOTE — DISCHARGE NOTE PROVIDER - CARE PROVIDER_API CALL
Ana Rosa Viera)  Family Medicine  [address] [place] [id number]  Phone: [phone]  Fax: [phone]  Established Patient  Follow Up Time: 2 weeks   Ana Rosa Viera)  Family Medicine  [address] [place] [address]  Phone: [phone]  Fax: [phone]  Established Patient  Follow Up Time: 2 weeks    Gray Christianson)  Neurology  [address] [place] [address], [address]  [place] [address]  Phone: [phone]  Fax: [phone]  Follow Up Time: 2 weeks

## 2021-08-09 NOTE — PROGRESS NOTE ADULT - ASSESSMENT
86yo F, PMHx of GERD, [place] palsy, HTN, HLD, presents for vertigo.    ~ Anticipated discharge today 8/9/21~    # Vertigo  - CTH 8/7/21: No acute intracranial abnormality  - CTAngio H&N 8/7/21: No large vessel occlusion  - EKG 8/7/21: Sinus rhythm, ST/T wave abnormality   - Dizziness has resolved since admission  - C/w Meclizine PRN  - Follow-up OP Neurologist  - PT harpreet appreciated  - Dc w/ rolling walker   - Patient is refusing subacute rehab, states wanting to go home with home care, lives with her daughter who will provide assistance    # HTN   - C/w Amlodipine, Losartan    # HLD   - Not on statin   - Lipid Panel 8/9/21: 124 LDL, 187 Chol, 60 HDL, 84 TG    # Vitamin B12 def  - C/w Cyanocobalamin  - F/u B12 levels    # GERD  - C/w Protonix     Diet: Regular Diet  Activity: IAT  DVT ppx: Not indicated  GI ppx: Protonix    Dispo: Home with home care

## 2021-08-09 NOTE — PROGRESS NOTE ADULT - SUBJECTIVE AND OBJECTIVE BOX
LENGTH OF HOSPITAL STAY: 2d      CHIEF COMPLAINT:   Patient is a 87y old  Female who presents with a chief complaint of dizziness.     OVER Past 24hrs:  No overnight events.    HISTORY OF PRESENTING ILLNESS:   86 yo f with pmh of gerd, bells palsy, htn, hld, presents with c/o dizziness.  Patient states that her symptoms started Friday at 10 pm when she was watching tv. All of the sudden the room started to spin. Sensation of room spinning has been coming on and off since then. Exacerbated by head movement, bending down or turning her head too fast. She states that she gets nauseous but has not had any vomiting. No recent illness -fevers, sob, coughs, runny nose, diarrhea, abdominal pain.   pt says she had a hard time walking due to the dizziness. Currently patient is feeling better and does not complain of any issues.  Patient lives with her daughter who is special needs at home. She does not feel like she has adequate help at home.     In the ED vitals were /71, HR 83, 96% on RA. Labs WNL. Imaging showed No acute intracranial abnormality. No large vessel occlusion. Severe degenerative changes of the spine.      PAST MEDICAL & SURGICAL HISTORY  GERD  [place] Palsy  HTN  HLD    REVIEW OF SYSTEMS  Negative, except as in Physical exam    ALLERGIES:  No Known Allergies    MEDICATIONS:  MEDICATIONS  (STANDING):  ALPRAZolam 0.5 milliGRAM(s) Oral at bedtime  amLODIPine   Tablet 10 milliGRAM(s) Oral daily  chlorhexidine 4% Liquid 1 Application(s) Topical <User Schedule>  citalopram 10 milliGRAM(s) Oral daily  cyanocobalamin 1000 MICROGram(s) Oral daily  heparin   Injectable 5000 Unit(s) SubCutaneous every 12 hours  losartan 100 milliGRAM(s) Oral daily  meclizine 25 milliGRAM(s) Oral every 8 hours  pantoprazole    Tablet 40 milliGRAM(s) Oral before breakfast    MEDICATIONS  (PRN):        VITALS:   Vital Signs Last 24 Hrs  T(C): 36.4 (09 Aug 2021 08:05), Max: 36.7 (09 Aug 2021 01:30)  T(F): 97.6 (09 Aug 2021 08:05), Max: 98 (09 Aug 2021 01:30)  HR: 88 (09 Aug 2021 09:30) (64 - 88)  BP: 134/61 (09 Aug 2021 09:30) (134/61 - 184/64)  BP(mean): --  RR: 18 (09 Aug 2021 09:30) (18 - 18)  SpO2: 97% (08 Aug 2021 23:18) (96% - 97%)    PHYSICAL EXAM:  General: No acute distress. alert, oriented, interactive, nonfocal.  HEENT: Pupils equal, reactive to light symmetrically, L facial droop  PULM: Clear to auscultation bilaterally, no significant sputum production.  CVS: Regular rate and rhythm, no murmurs, rubs, or gallops.  Abdomen: Soft, nondistended, nontender  Extremities: No edema, nontender  SKIN: Warm and well perfused, no rashes    LABS:                                   13.2   8.77  )-----------( 285      ( 09 Aug 2021 06:55 )             41.4   08-09    141  |  103  |  10  ----------------------------<  83  3.6   |  24  |  0.8    Ca    9.5      09 Aug 2021 06:55  Mg     1.8     08-09    TPro  6.6  /  Alb  4.4  /  TBili  1.2  /  DBili  x   /  AST  19  /  ALT  12  /  AlkPhos  105  08-09                         RADIOLOGY:  < from: CT Angio Head w/ IV Cont (08.07.21 @ 18:47) >  No large vessel occlusion.    Severe degenerative changes of the spine.    < end of copied text >  < from: CT Head No Cont (08.07.21 @ 16:49) >    Impression:    No acute intracranial abnormality.    < end of copied text >             LENGTH OF HOSPITAL STAY: 2d      CHIEF COMPLAINT:   Patient is a 87y old  Female who presents with a chief complaint of dizziness.     OVER Past 24hrs:  No overnight events.    HISTORY OF PRESENTING ILLNESS:   88 yo f with pmh of gerd, bells palsy, htn, hld, presents with c/o dizziness.  Patient states that her symptoms started Friday at 10 pm when she was watching tv. All of the sudden the room started to spin. Sensation of room spinning has been coming on and off since then. Exacerbated by head movement, bending down or turning her head too fast. She states that she gets nauseous but has not had any vomiting. No recent illness -fevers, sob, coughs, runny nose, diarrhea, abdominal pain.   pt says she had a hard time walking due to the dizziness. Currently patient is feeling better and does not complain of any issues.  Patient lives with her daughter who is special needs at home. She does not feel like she has adequate help at home.     In the ED vitals were /71, HR 83, 96% on RA. Labs WNL. Imaging showed No acute intracranial abnormality. No large vessel occlusion. Severe degenerative changes of the spine.      PAST MEDICAL & SURGICAL HISTORY  GERD  [place] Palsy  HTN  HLD    REVIEW OF SYSTEMS  Negative, except as in Physical exam    ALLERGIES:  No Known Allergies    MEDICATIONS:  MEDICATIONS  (STANDING):  ALPRAZolam 0.5 milliGRAM(s) Oral at bedtime  amLODIPine   Tablet 10 milliGRAM(s) Oral daily  chlorhexidine 4% Liquid 1 Application(s) Topical <User Schedule>  citalopram 10 milliGRAM(s) Oral daily  cyanocobalamin 1000 MICROGram(s) Oral daily  heparin   Injectable 5000 Unit(s) SubCutaneous every 12 hours  losartan 100 milliGRAM(s) Oral daily  meclizine 25 milliGRAM(s) Oral every 8 hours  pantoprazole    Tablet 40 milliGRAM(s) Oral before breakfast    MEDICATIONS  (PRN):        VITALS:   Vital Signs Last 24 Hrs  T(C): 36.4 (09 Aug 2021 08:05), Max: 36.7 (09 Aug 2021 01:30)  T(F): 97.6 (09 Aug 2021 08:05), Max: 98 (09 Aug 2021 01:30)  HR: 88 (09 Aug 2021 09:30) (64 - 88)  BP: 134/61 (09 Aug 2021 09:30) (134/61 - 184/64)  BP(mean): --  RR: 18 (09 Aug 2021 09:30) (18 - 18)  SpO2: 97% (08 Aug 2021 23:18) (96% - 97%)    PHYSICAL EXAM:  General: No acute distress. alert, oriented, interactive, nonfocal.  HEENT: Pupils equal, reactive to light symmetrically, mild L facial droop  PULM: Clear to auscultation bilaterally, no significant sputum production.  CVS: Regular rate and rhythm, no murmurs, rubs, or gallops.  Abdomen: Soft, nondistended, nontender  Extremities: No edema, nontender  SKIN: Warm and well perfused, no rashes    LABS:                                   13.2   8.77  )-----------( 285      ( 09 Aug 2021 06:55 )             41.4   08-09    141  |  103  |  10  ----------------------------<  83  3.6   |  24  |  0.8    Ca    9.5      09 Aug 2021 06:55  Mg     1.8     08-09    TPro  6.6  /  Alb  4.4  /  TBili  1.2  /  DBili  x   /  AST  19  /  ALT  12  /  AlkPhos  105  08-09                         RADIOLOGY:  < from: CT Angio Head w/ IV Cont (08.07.21 @ 18:47) >  No large vessel occlusion.    Severe degenerative changes of the spine.    < end of copied text >  < from: CT Head No Cont (08.07.21 @ 16:49) >    Impression:    No acute intracranial abnormality.    < end of copied text >

## 2021-08-09 NOTE — OCCUPATIONAL THERAPY INITIAL EVALUATION ADULT - VISUAL ASSESSMENT: TRACKING
pt reports that dizziness and off balance feelings have resolved/normal no chest pain, no cough, and no shortness of breath.

## 2021-08-09 NOTE — DISCHARGE NOTE PROVIDER - NSDCFUADDINST_GEN_ALL_CORE_FT
Please follow-up in 2 weeks with your primary care physician.  Please follow-up in 2 weeks with your primary care physician.     Please follow-up with a neurologist regarding management of your vertigo.

## 2021-08-09 NOTE — OCCUPATIONAL THERAPY INITIAL EVALUATION ADULT - GENERAL OBSERVATIONS, REHAB EVAL
Pt received semi hoffmann in bed in NAD, agreeable to OT evaluation, +IV lock, left semi hoffmann in bed as pt requested not to get OOB to chair.

## 2021-08-09 NOTE — DISCHARGE NOTE PROVIDER - NSDCHHATTENDCERT_GEN_ALL_CORE
My signature below certifies that the above stated patient is homebound and upon completion of the Face-To-Face encounter, has the need for intermittent skilled nursing, physical therapy and/or speech or occupational therapy services in their home for their current diagnosis as outlined in their initial plan of care. These services will continue to be monitored by myself or another physician.

## 2021-08-09 NOTE — OCCUPATIONAL THERAPY INITIAL EVALUATION ADULT - ADL RETRAINING, OT EVAL
Patient will perform lower body dressing with supervision with use of appropriate adaptive equipment as needed by discharge. ; Patient will perform bathing with supervision with use of appropriate adaptive equipment and/or DME by discharge.

## 2021-08-09 NOTE — PHYSICAL THERAPY INITIAL EVALUATION ADULT - GENERAL OBSERVATIONS, REHAB EVAL
Attempted to see pt for b/s PT IE this AM. Upon encountering pt, pt states she feels confused (which just started a few minutes ago) & has a headache 7/10 that came of suddenly. Vitals assessed BP: 168/68, HR: 81bpm, SpO2 95%RA. RN Jane bustamante. RN assessed pt & states she will call doctor to evaluate pt. PT to f/u as appropriate.
PT harpreet [id number]. Chart reviewed and case discussed with NEGRITO Gay. Pt encountered semi-reclined in bed. + IV lock, + primafit on. pt willing to participate in PT session.

## 2021-08-09 NOTE — DISCHARGE NOTE PROVIDER - PROVIDER TOKENS
PROVIDER:[TOKEN:[[id number]],FOLLOWUP:[2 weeks],ESTABLISHEDPATIENT:[T]] PROVIDER:[TOKEN:[[id number]],FOLLOWUP:[2 weeks],ESTABLISHEDPATIENT:[T]],PROVIDER:[TOKEN:[[id number]],FOLLOWUP:[2 weeks]]

## 2021-08-10 LAB — VIT B12 SERPL-MCNC: >2000 PG/ML — HIGH (ref 232–1245)

## 2021-08-13 DIAGNOSIS — K21.9 GASTRO-ESOPHAGEAL REFLUX DISEASE WITHOUT ESOPHAGITIS: ICD-10-CM

## 2021-08-13 DIAGNOSIS — R42 DIZZINESS AND GIDDINESS: ICD-10-CM

## 2021-08-13 DIAGNOSIS — E53.8 DEFICIENCY OF OTHER SPECIFIED B GROUP VITAMINS: ICD-10-CM

## 2021-08-13 DIAGNOSIS — F32.9 MAJOR DEPRESSIVE DISORDER, SINGLE EPISODE, UNSPECIFIED: ICD-10-CM

## 2021-08-13 DIAGNOSIS — E78.5 HYPERLIPIDEMIA, UNSPECIFIED: ICD-10-CM

## 2021-08-13 DIAGNOSIS — I10 ESSENTIAL (PRIMARY) HYPERTENSION: ICD-10-CM

## 2021-08-13 DIAGNOSIS — Z59.7 INSUFFICIENT SOCIAL INSURANCE AND WELFARE SUPPORT: ICD-10-CM

## 2021-08-13 SDOH — ECONOMIC STABILITY - INCOME SECURITY: INSUFFICIENT SOCIAL INSURANCE AND WELFARE SUPPORT: Z59.7

## 2022-06-02 NOTE — PATIENT PROFILE ADULT - FUNCTIONAL SCREEN CURRENT LEVEL: SWALLOWING (IF SCORE 2 OR MORE FOR ANY ITEM, CONSULT REHAB SERVICES), MLM)
0 = swallows foods/liquids without difficulty Clofazimine Pregnancy And Lactation Text: This medication is Pregnancy Category C and isn't considered safe during pregnancy. It is excreted in breast milk.

## 2023-05-11 ENCOUNTER — EMERGENCY (EMERGENCY)
Facility: HOSPITAL | Age: 88
LOS: 0 days | Discharge: HOME | End: 2023-05-11
Attending: EMERGENCY MEDICINE
Payer: MEDICARE

## 2023-05-11 VITALS
SYSTOLIC BLOOD PRESSURE: 182 MMHG | RESPIRATION RATE: 19 BRPM | HEART RATE: 88 BPM | DIASTOLIC BLOOD PRESSURE: 72 MMHG | OXYGEN SATURATION: 96 % | TEMPERATURE: 98 F

## 2023-05-11 DIAGNOSIS — K59.00 CONSTIPATION, UNSPECIFIED: ICD-10-CM

## 2023-05-11 DIAGNOSIS — I10 ESSENTIAL (PRIMARY) HYPERTENSION: ICD-10-CM

## 2023-05-11 DIAGNOSIS — K21.9 GASTRO-ESOPHAGEAL REFLUX DISEASE WITHOUT ESOPHAGITIS: ICD-10-CM

## 2023-05-11 DIAGNOSIS — E78.5 HYPERLIPIDEMIA, UNSPECIFIED: ICD-10-CM

## 2023-05-11 DIAGNOSIS — Z90.49 ACQUIRED ABSENCE OF OTHER SPECIFIED PARTS OF DIGESTIVE TRACT: ICD-10-CM

## 2023-05-11 PROBLEM — G51.0 BELL'S PALSY: Chronic | Status: ACTIVE | Noted: 2021-08-07

## 2023-05-11 LAB
ALBUMIN SERPL ELPH-MCNC: 4.7 G/DL — SIGNIFICANT CHANGE UP (ref 3.5–5.2)
ALP SERPL-CCNC: 127 U/L — HIGH (ref 30–115)
ALT FLD-CCNC: 20 U/L — SIGNIFICANT CHANGE UP (ref 0–41)
ANION GAP SERPL CALC-SCNC: 15 MMOL/L — HIGH (ref 7–14)
AST SERPL-CCNC: 21 U/L — SIGNIFICANT CHANGE UP (ref 0–41)
BASOPHILS # BLD AUTO: 0.03 K/UL — SIGNIFICANT CHANGE UP (ref 0–0.2)
BASOPHILS NFR BLD AUTO: 0.5 % — SIGNIFICANT CHANGE UP (ref 0–1)
BILIRUB SERPL-MCNC: 0.7 MG/DL — SIGNIFICANT CHANGE UP (ref 0.2–1.2)
BUN SERPL-MCNC: 15 MG/DL — SIGNIFICANT CHANGE UP (ref 10–20)
CALCIUM SERPL-MCNC: 9.7 MG/DL — SIGNIFICANT CHANGE UP (ref 8.4–10.5)
CHLORIDE SERPL-SCNC: 98 MMOL/L — SIGNIFICANT CHANGE UP (ref 98–110)
CO2 SERPL-SCNC: 25 MMOL/L — SIGNIFICANT CHANGE UP (ref 17–32)
CREAT SERPL-MCNC: 0.9 MG/DL — SIGNIFICANT CHANGE UP (ref 0.7–1.5)
EGFR: 61 ML/MIN/1.73M2 — SIGNIFICANT CHANGE UP
EOSINOPHIL # BLD AUTO: 0.02 K/UL — SIGNIFICANT CHANGE UP (ref 0–0.7)
EOSINOPHIL NFR BLD AUTO: 0.3 % — SIGNIFICANT CHANGE UP (ref 0–8)
GLUCOSE SERPL-MCNC: 118 MG/DL — HIGH (ref 70–99)
HCT VFR BLD CALC: 41 % — SIGNIFICANT CHANGE UP (ref 37–47)
HGB BLD-MCNC: 13.1 G/DL — SIGNIFICANT CHANGE UP (ref 12–16)
IMM GRANULOCYTES NFR BLD AUTO: 0.3 % — SIGNIFICANT CHANGE UP (ref 0.1–0.3)
LIDOCAIN IGE QN: 18 U/L — SIGNIFICANT CHANGE UP (ref 7–60)
LYMPHOCYTES # BLD AUTO: 0.89 K/UL — LOW (ref 1.2–3.4)
LYMPHOCYTES # BLD AUTO: 13.9 % — LOW (ref 20.5–51.1)
MCHC RBC-ENTMCNC: 27.3 PG — SIGNIFICANT CHANGE UP (ref 27–31)
MCHC RBC-ENTMCNC: 32 G/DL — SIGNIFICANT CHANGE UP (ref 32–37)
MCV RBC AUTO: 85.4 FL — SIGNIFICANT CHANGE UP (ref 81–99)
MONOCYTES # BLD AUTO: 0.41 K/UL — SIGNIFICANT CHANGE UP (ref 0.1–0.6)
MONOCYTES NFR BLD AUTO: 6.4 % — SIGNIFICANT CHANGE UP (ref 1.7–9.3)
NEUTROPHILS # BLD AUTO: 5.05 K/UL — SIGNIFICANT CHANGE UP (ref 1.4–6.5)
NEUTROPHILS NFR BLD AUTO: 78.6 % — HIGH (ref 42.2–75.2)
NRBC # BLD: 0 /100 WBCS — SIGNIFICANT CHANGE UP (ref 0–0)
PLATELET # BLD AUTO: 375 K/UL — SIGNIFICANT CHANGE UP (ref 130–400)
PMV BLD: 9.4 FL — SIGNIFICANT CHANGE UP (ref 7.4–10.4)
POTASSIUM SERPL-MCNC: 3.8 MMOL/L — SIGNIFICANT CHANGE UP (ref 3.5–5)
POTASSIUM SERPL-SCNC: 3.8 MMOL/L — SIGNIFICANT CHANGE UP (ref 3.5–5)
PROT SERPL-MCNC: 7.4 G/DL — SIGNIFICANT CHANGE UP (ref 6–8)
RBC # BLD: 4.8 M/UL — SIGNIFICANT CHANGE UP (ref 4.2–5.4)
RBC # FLD: 13.9 % — SIGNIFICANT CHANGE UP (ref 11.5–14.5)
SODIUM SERPL-SCNC: 138 MMOL/L — SIGNIFICANT CHANGE UP (ref 135–146)
WBC # BLD: 6.42 K/UL — SIGNIFICANT CHANGE UP (ref 4.8–10.8)
WBC # FLD AUTO: 6.42 K/UL — SIGNIFICANT CHANGE UP (ref 4.8–10.8)

## 2023-05-11 PROCEDURE — 99284 EMERGENCY DEPT VISIT MOD MDM: CPT | Mod: 25

## 2023-05-11 PROCEDURE — 83690 ASSAY OF LIPASE: CPT

## 2023-05-11 PROCEDURE — 85025 COMPLETE CBC W/AUTO DIFF WBC: CPT

## 2023-05-11 PROCEDURE — 36415 COLL VENOUS BLD VENIPUNCTURE: CPT

## 2023-05-11 PROCEDURE — 99284 EMERGENCY DEPT VISIT MOD MDM: CPT | Mod: FS

## 2023-05-11 PROCEDURE — 80053 COMPREHEN METABOLIC PANEL: CPT

## 2023-05-11 RX ORDER — POLYETHYLENE GLYCOL 3350 17 G/17G
17 POWDER, FOR SOLUTION ORAL
Qty: 1 | Refills: 0
Start: 2023-05-11 | End: 2023-05-12

## 2023-05-11 RX ORDER — SENNA PLUS 8.6 MG/1
1 TABLET ORAL
Qty: 10 | Refills: 0
Start: 2023-05-11 | End: 2023-05-15

## 2023-05-11 RX ORDER — DIATRIZOATE MEGLUMINE 180 MG/ML
30 INJECTION, SOLUTION INTRAVESICAL ONCE
Refills: 0 | Status: COMPLETED | OUTPATIENT
Start: 2023-05-11 | End: 2023-05-11

## 2023-05-11 RX ADMIN — DIATRIZOATE MEGLUMINE 30 MILLILITER(S): 180 INJECTION, SOLUTION INTRAVESICAL at 11:38

## 2023-05-11 NOTE — ED PROVIDER NOTE - OBJECTIVE STATEMENT
89-year-old female with past medical history of hypertension, hyperlipidemia, GERD presenting for evaluation of inability to have a bowel movement over the last 5 days.  States that she has been passing a lot of gas.  Has tried MiraLAX without any relief.  Denies any abdominal pain.  No nausea or vomiting.  No back pain.

## 2023-05-11 NOTE — ED PROVIDER NOTE - NS ED ATTENDING STATEMENT MOD
This was a shared visit with the ELI. I reviewed and verified the documentation and independently performed the documented:

## 2023-05-11 NOTE — ED ADULT NURSE NOTE - NSFALLHARMRISKINTERV_ED_ALL_ED

## 2023-05-11 NOTE — ED PROVIDER NOTE - CARE PROVIDER_API CALL
Radha Leon (MD)  Gastroenterology  [address] [place] [address]  Phone: [phone]  Fax: [phone]  Follow Up Time: 1-3 Days

## 2023-05-11 NOTE — ED PROVIDER NOTE - CLINICAL SUMMARY MEDICAL DECISION MAKING FREE TEXT BOX
Patient's labs WNL, no acute intra-abdominal process identified at this time, abdomen soft, non-tender. Abdominal exam without peritoneal signs. No evidence of acute abdomen currently. Had 2 bm in ED. Well appearing. Given work up, low suspicion for acute hepatobiliary disease (including acute cholecystitis or cholangitis), acute pancreatitis (neg lipase), PUD (including gastric perforation), acute infectious processes (pneumonia, hepatitis, pyelonephritis), acute appendicitis, vascular catastrophe, bowel obstruction, viscus perforation, or testicular torsion, diverticulitis. Presentation not consistent with other acute, emergent causes of abdominal pain at this time.    They were given detailed return precautions and advised to return to the emergency department in 2-3 days if not improving or sooner if any new symptoms develop, symptoms worsened or for any concerns. They were offered the opportunity to ask questions and verbalized that they understand the diagnosis and discharge instructions.

## 2023-05-11 NOTE — ED PROVIDER NOTE - PATIENT PORTAL LINK FT
You can access the FollowMyHealth Patient Portal offered by [facility] by registering at the following website: [email or web address]. By joining [facility]’s FollowMyHealth portal, you will also be able to view your health information using other applications (apps) compatible with our system.

## 2023-05-11 NOTE — ED PROVIDER NOTE - PROGRESS NOTE DETAILS
kapilat- patient had 2x large bowel movements in the ED.  patient abd remains non-tender. aly Resident MDM: 89-year-old female with past history of hypertension hyperlipidemia and GERD presents with inability to move bowels for the past 5 days. Patient states that she has had flatulence since that time. Denies abdominal pain abdominal distention. Patient is a past surgical history of cholecystectomy and appendectomy. Patient able to tolerate p.o. states she is nauseous but denies vomiting. Denies chest pain or shortness of breath back pain.     PHYSICAL EXAM: I have reviewed current vital signs.  GENERAL: NAD, well-nourished; well-developed.  HEAD:  Normocephalic, atraumatic.  EYES: EOMI, PERRL, conjunctiva and sclera clear.  ENT: MMM, no erythema/exudates.  NECK: Supple, no JVD.  CHEST/LUNG: Clear to auscultation bilaterally; no wheezes, rales, or rhonchi.  HEART: Regular rate and rhythm, normal S1 and S2; no murmurs, rubs, or gallops.  ABDOMEN: Soft, nontender, nondistended.  EXTREMITIES:  2+ peripheral pulses; no clubbing, cyanosis, or edema.  PSYCH: Cooperative, appropriate, normal mood and affect.  NEUROLOGY: A&O x 3. Motor 5/5. Sensory intact. No focal neurological deficits. CN II - XII intact. (-) dysmetria, facial droop, pronator drift.  SKIN: Warm and dry. aly Resident MDM: 89-year-old female with past history of hypertension hyperlipidemia and GERD presents with inability to move bowels for the past 5 days. Patient states that she has had flatulence since that time. Denies abdominal pain abdominal distention. Patient is a past surgical history of cholecystectomy and appendectomy. Patient able to tolerate p.o. states she is nauseous but denies vomiting. Denies chest pain or shortness of breath back pain.     PHYSICAL EXAM: I have reviewed current vital signs.  GENERAL: NAD, well-nourished; well-developed.  HEAD:  Normocephalic, atraumatic.  EYES: EOMI, PERRL, conjunctiva and sclera clear.  ENT: MMM, no erythema/exudates.  NECK: Supple, no JVD.  CHEST/LUNG: Clear to auscultation bilaterally; no wheezes, rales, or rhonchi.  HEART: Regular rate and rhythm, normal S1 and S2; no murmurs, rubs, or gallops.  ABDOMEN: Soft, nontender, nondistended.  EXTREMITIES:  2+ peripheral pulses; no clubbing, cyanosis, or edema.  PSYCH: Cooperative, appropriate, normal mood and affect.  NEUROLOGY: A&O x 3. Motor 5/5. Sensory intact. No focal neurological deficits. CN II - XII intact. (-) dysmetria, facial droop, pronator drift.  SKIN: Warm and dry.      MDM: Differential diagnosis broad including but not limited to constipation, unlikely SBO as patient has had 2 bowel movements in ED and is tolerating PO.  Abd soft non tender.  will d/c with stool softeners and out patient follow up.

## 2023-05-11 NOTE — ED PROVIDER NOTE - PHYSICAL EXAMINATION
VITAL SIGNS: I have reviewed nursing notes and confirm.  CONSTITUTIONAL: Patient is in no acute distress.  SKIN: Skin exam is warm and dry, no acute rash.  HEAD: Normocephalic; atraumatic.  EYES: PERRL, EOM intact; conjunctiva and sclera clear.  ENT: No nasal discharge; airway clear.   NECK: Supple; non tender.  CARD: S1, S2 normal; no murmurs, gallops, or rubs. Regular rate and rhythm.  RESP: Clear to auscultation bilaterally. No wheezes, rales or rhonchi.  ABD: Normal bowel sounds; soft; non-distended; non-tender.   RECTAL: No impaction. No stool appreciated.   EXT: Normal ROM. No edema.  LYMPH: No acute cervical adenopathy.  NEURO: Alert, oriented. Grossly unremarkable. No focal deficits.  PSYCH: Cooperative, appropriate.

## 2023-05-11 NOTE — ED PROVIDER NOTE - ATTENDING APP SHARED VISIT CONTRIBUTION OF CARE
89-year-old female history of HTN, HLD, appendectomy, cholecystectomy, non-smoker, denies daily EtOH use, now presents with constipation for the past several days, persistent, denies modifying factors, associated nausea, last bowel movement was 6 days ago, passing flatus, denies fever, v/d, anorexia, cough, respiratory sx, change in bowel habits or urinary sx, vaginal d/c or other associated complaints at present. Old chart reviewed. I have reviewed and agree with the initial nursing note, except as documented in my note.    VSS, awake, alert, non-toxic appearing, no scleral icterus, oropharynx clear, mmm, no jaundice, skin rash or lesions, chest CTAB, non-labored breathing, no w/r/r, +S1/S2, RRR, no m/r/g, abdomen soft, NT, +BS, no hernias or distention, no pulsatile masses or bruits appreciated, no CVA tenderness, no peripheral edema or deformities, alert, clear speech and steady gait.

## 2023-06-06 ENCOUNTER — APPOINTMENT (OUTPATIENT)
Dept: GASTROENTEROLOGY | Facility: CLINIC | Age: 88
End: 2023-06-06